# Patient Record
Sex: MALE | Race: WHITE
[De-identification: names, ages, dates, MRNs, and addresses within clinical notes are randomized per-mention and may not be internally consistent; named-entity substitution may affect disease eponyms.]

---

## 2018-02-27 ENCOUNTER — HOSPITAL ENCOUNTER (EMERGENCY)
Dept: HOSPITAL 17 - PHED | Age: 23
Discharge: HOME | End: 2018-02-27
Payer: SELF-PAY

## 2018-02-27 VITALS
DIASTOLIC BLOOD PRESSURE: 63 MMHG | OXYGEN SATURATION: 98 % | RESPIRATION RATE: 16 BRPM | HEART RATE: 51 BPM | SYSTOLIC BLOOD PRESSURE: 140 MMHG | TEMPERATURE: 98.7 F

## 2018-02-27 VITALS — WEIGHT: 174.17 LBS | HEIGHT: 74 IN | BODY MASS INDEX: 22.35 KG/M2

## 2018-02-27 DIAGNOSIS — F17.210: ICD-10-CM

## 2018-02-27 DIAGNOSIS — K08.89: Primary | ICD-10-CM

## 2018-02-27 PROCEDURE — 99283 EMERGENCY DEPT VISIT LOW MDM: CPT

## 2018-02-27 NOTE — PD
HPI


Chief Complaint:  Oral / Dental Pain or Problem


Time Seen by Provider:  18:45


Travel History


International Travel<30 days:  No


Contact w/Intl Traveler<30days:  No


Traveled to known affect area:  No





History of Present Illness


HPI


22-year-old male that presents to the ED for evaluation of right lower dental 

pain.  Patient has had this for about 3 days.  Per patient he broke his tooth 

about 3 weeks ago.  His been having similar complaint pain on the right side of 

the lower jaw.  Per patient is an infections in this tooth before.  Per patient 

he has no dentist at this time.  He is here hoping to get an antibiotic to get 

rid of it.   No other medical issues.  No fevers chills or sweats.





PFSH


Social History


Alcohol Use:  Yes (WEEKENDS)


Tobacco Use:  Yes (1/4 PPD)


Substance Use:  Yes (WEED)





Allergies-Medications


(Allergen,Severity, Reaction):  


Coded Allergies:  


     No Known Allergies (Unverified , 8/24/16)


Reported Meds & Prescriptions





Reported Meds & Active Scripts


Active


Magic Mouthwash Adult Liq (Multi-Ingredient Mouthwash/Gargle) 120 Ml Susp 5 Ml 

SWISH-SWAL ACHS


     Each 5mL contains: Nystatin 200,000units,


     Diphenhydramine 4.25mg, Viscous Lidocaine 10mg,


     Cherry syrup 0.8 mL


Diclofenac Sodium DR (Diclofenac Sodium) 75 Mg Tabdr 75 Mg PO BID PRN


Penicillin V Potassium 500 Mg Tab 500 Mg PO Q8H 10 Days


Ultram (Tramadol HCl) 50 Mg Tab 50 Mg PO Q6H PRN


     FOR PAIN


Penicillin V Potassium 500 Mg Tab 500 Mg PO QID








Review of Systems


Except as stated in HPI:  all other systems reviewed are Neg





Physical Exam


Narrative


GENERAL: 


SKIN: Warm and dry.


HEAD: Atraumatic. Normocephalic. 


EYES: Pupils equal and round. No scleral icterus. No injection or drainage. 


ENT: No nasal bleeding or discharge.  Mucous membranes pink and moist.  Tongue 

is midline.  Dental: Patient has bad dentition not on the second and first 

molar of the right lower jaw.  Tenderness to palpation in this area.  Some 

erythema noted on the gums but no obvious purulence or mass.


NECK: Trachea midline. No JVD. 


CARDIOVASCULAR: Regular rate and rhythm.  


RESPIRATORY: No accessory muscle use. Clear to auscultation. Breath sounds 

equal bilaterally. 


GASTROINTESTINAL: Abdomen soft, non-tender, nondistended. Hepatic and splenic 

margins not palpable. 


MUSCULOSKELETAL: Extremities without clubbing, cyanosis, or edema. No obvious 

deformities. 


NEUROLOGICAL: Awake and alert. No obvious cranial nerve deficits.  Motor 

grossly within normal limits. Five out of 5 muscle strength in the arms and 

legs.  Normal speech.


PSYCHIATRIC: Appropriate mood and affect; insight and judgment normal.





Data


Data


Last Documented VS





Vital Signs








  Date Time  Temp Pulse Resp B/P (MAP) Pulse Ox O2 Delivery O2 Flow Rate FiO2


 


2/27/18 18:18 98.7 51 16 140/63 (88) 98 Room Air  








Orders





 Orders


Ed Discharge Order (2/27/18 18:58)








MDM


Medical Decision Making


Medical Screen Exam Complete:  Yes


Emergency Medical Condition:  Yes


Medical Record Reviewed:  Yes


Differential Diagnosis


Tooth pain versus dentalgia versus abscess


Narrative Course


22-year-old male that presents to the ED for evaluation of dental pain.  

Patient was properly examined and was found to have signs and symptoms 

consistent appears to be dental pain.  Possible from infection.  Patient will 

be treated with this with amoxicillin, diclofenac sodium, and Magic mouthwash.  

Follow with PCP.  See ED worsening symptoms.  Follow with dentist.





Diagnosis





 Primary Impression:  


 Dentalgia


Patient Instructions:  General Instructions





***Additional Instructions:  


Take medication as prescribed.  Follow-up with PCP.  See ED worsening symptoms.


***Med/Other Pt SpecificInfo:  Prescription(s) given


Scripts


Nystatin-Diphenhydramine-Lidocaine Liq (Magic Mouthwash Adult Liq) 120 Ml Susp


5 ML SWISH-SWAL ACHS for Mouth sores, #120 ML 0 Refills


   Each 5mL contains: Nystatin 200,000units,


   Diphenhydramine 4.25mg, Viscous Lidocaine 10mg,


   Cherry syrup 0.8 mL


   Prov: Porfirio Ferrer MD         2/27/18 


Diclofenac Sodium DR (Diclofenac Sodium DR) 75 Mg Tabdr


75 MG PO BID Y for PAIN SCALE 1 TO 10, #20 TAB 0 Refills


   Prov: Porfirio Ferrer MD         2/27/18 


Penicillin V Potassium (Penicillin V Potassium) 500 Mg Tab


500 MG PO Q8H for Infection for 10 Days, #30 TAB 0 Refills


   Prov: Porfirio Ferrer MD         2/27/18


Disposition:  01 DISCHARGE HOME


Condition:  Chevy Harding Feb 27, 2018 19:01